# Patient Record
Sex: MALE | Race: BLACK OR AFRICAN AMERICAN | NOT HISPANIC OR LATINO | Employment: UNEMPLOYED | URBAN - METROPOLITAN AREA
[De-identification: names, ages, dates, MRNs, and addresses within clinical notes are randomized per-mention and may not be internally consistent; named-entity substitution may affect disease eponyms.]

---

## 2021-12-08 ENCOUNTER — HOSPITAL ENCOUNTER (EMERGENCY)
Facility: CLINIC | Age: 21
Discharge: HOME | End: 2021-12-08
Attending: EMERGENCY MEDICINE | Admitting: EMERGENCY MEDICINE
Payer: COMMERCIAL

## 2021-12-08 VITALS
OXYGEN SATURATION: 98 % | HEART RATE: 57 BPM | DIASTOLIC BLOOD PRESSURE: 64 MMHG | WEIGHT: 127.3 LBS | TEMPERATURE: 98.6 F | SYSTOLIC BLOOD PRESSURE: 110 MMHG | RESPIRATION RATE: 16 BRPM

## 2021-12-08 DIAGNOSIS — K05.10 GINGIVITIS: ICD-10-CM

## 2021-12-08 PROCEDURE — 99283 EMERGENCY DEPT VISIT LOW MDM: CPT | Performed by: EMERGENCY MEDICINE

## 2021-12-08 PROCEDURE — 250N000013 HC RX MED GY IP 250 OP 250 PS 637: Performed by: EMERGENCY MEDICINE

## 2021-12-08 PROCEDURE — 99284 EMERGENCY DEPT VISIT MOD MDM: CPT | Performed by: EMERGENCY MEDICINE

## 2021-12-08 RX ORDER — KETOROLAC TROMETHAMINE 10 MG/1
10 TABLET, FILM COATED ORAL EVERY 6 HOURS PRN
Qty: 20 TABLET | Refills: 0 | Status: SHIPPED | OUTPATIENT
Start: 2021-12-08

## 2021-12-08 RX ORDER — CHLORHEXIDINE GLUCONATE ORAL RINSE 1.2 MG/ML
15 SOLUTION DENTAL 2 TIMES DAILY
Qty: 473 ML | Refills: 0 | Status: SHIPPED | OUTPATIENT
Start: 2021-12-08

## 2021-12-08 RX ORDER — LIDOCAINE HYDROCHLORIDE 20 MG/ML
5 SOLUTION OROPHARYNGEAL 2 TIMES DAILY
Qty: 100 ML | Refills: 0 | Status: SHIPPED | OUTPATIENT
Start: 2021-12-08

## 2021-12-08 RX ORDER — KETOROLAC TROMETHAMINE 10 MG/1
10 TABLET, FILM COATED ORAL ONCE
Status: DISCONTINUED | OUTPATIENT
Start: 2021-12-08 | End: 2021-12-08 | Stop reason: HOSPADM

## 2021-12-08 RX ORDER — ACETAMINOPHEN 325 MG/1
975 TABLET ORAL ONCE
Status: COMPLETED | OUTPATIENT
Start: 2021-12-08 | End: 2021-12-08

## 2021-12-08 RX ORDER — LIDOCAINE HYDROCHLORIDE 20 MG/ML
5 SOLUTION OROPHARYNGEAL ONCE
Status: DISCONTINUED | OUTPATIENT
Start: 2021-12-08 | End: 2021-12-08 | Stop reason: HOSPADM

## 2021-12-08 RX ADMIN — ACETAMINOPHEN 975 MG: 325 TABLET, FILM COATED ORAL at 16:04

## 2021-12-08 NOTE — ED PROVIDER NOTES
[facility] EMERGENCY DEPARTMENT ([facility])    12/08/21     ED 7 4:11 PM   History     Chief Complaint   Patient presents with     Dental Pain     The history is provided by the patient and medical records.     Nikhil Rossi is a 21 year old male with history of sickle cell trait who presents with pain and bleeding for the past few weeks. He states his whole mouth has been fragile for the past few weeks has bleeding from gums daily, spits out blood daily. He reports that his pain is 6/10, though pain can reach 15/10. The pain is diffusely in his entire mouth, arvind seems focal in his gums and in between his teeth. The pain is at its worst in the mornings. He tried 800 mg Tylenol and Orajel which was ineffective at controlling his pain. He has been using Crest toothpaste and is consistent at brushing his teeth. He denies bleeding elsewhere. He denies any bleeding or clotting disorder. He denies chronic medical problems. No skin swelling or abnormalities. He denies any known allergies. He is a nonsmoker. He flosses sometimes. He denies chewing gum or chewing tobacco. He denies any loose teeth. He can't afford a dentist and so presents here for evaluation. He does not have any dental coverage or insurance whatsoever.     Past Medical History  Past Medical History:   Diagnosis Date     ADHD (attention deficit hyperactivity disorder)      No past surgical history on file.  amoxicillin-clavulanate (AUGMENTIN) 875-125 MG tablet  Amphetamine-Dextroamphetamine (ADDERALL PO)  chlorhexidine (PERIDEX) 0.12 % solution  ketorolac (TORADOL) 10 MG tablet  lidocaine, viscous, (XYLOCAINE) 2 % solution      No Known Allergies  Family History  No family history on file.  Social History   Social History     Tobacco Use     Smoking status: None     Smokeless tobacco: None   Substance Use Topics     Alcohol use: None     Comment: pt would not answer questions     Drug use: None     Comment: pt did not answer question       Past medical history, past surgical history, medications, allergies, family history, and social history were reviewed with the patient. No additional pertinent items.       Review of Systems   HENT: Positive for dental problem (bleeding, painful gums).      A complete review of systems was performed with pertinent positives and negatives noted in the HPI, and all other systems negative.    Physical Exam   BP: 110/64  Pulse: 57  Temp: 98.6  F (37  C)  Resp: 16  Weight: 57.7 kg (127 lb 4.8 oz)  SpO2: 98 %     Physical Exam  Gen:A&Ox3, no acute distress  HEENT:PERRL, no facial tenderness or wounds, head atraumatic, oropharynx clear, mucous membranes moist, TMs clear bilaterally  Neck: no neck or submandibular swelling.   CV:RRR without murmurs  PULM:Clear to auscultation bilaterally  Abd:soft, nontender, nondistended. Bowel sounds present and normal  Dental: No loose or chipped teeth. Has extensive gum friability, hyperpigmentation, tenderness concerning for ANUG. No abscesses.     ED Course      Procedures       No results found for any visits on 12/08/21.  Medications   acetaminophen (TYLENOL) tablet 975 mg (975 mg Oral Given 12/8/21 1604)        Assessments & Plan (with Medical Decision Making)   22 yo M presenting with gum pain and bleeding. Has extensive gingivitis with overgrowth and hyperpigmentation concerning for ANUG. No skin/soft tissue or neck deep space infections noted, and no intraoral abscessed present.   Vitals stable.  Started on a course of augmentin and peridex mouth rinse BID.   Toradol and viscous lidocaine PRN for pain.   Referred to Dental.     I have reviewed the nursing notes. I have reviewed the findings, diagnosis, plan and need for follow up with the patient.    Discharge Medication List as of 12/8/2021  5:00 PM      START taking these medications    Details   amoxicillin-clavulanate (AUGMENTIN) 875-125 MG tablet Take 1 tablet by mouth 2 times daily for 10 days, Disp-20 tablet, R-0,  E-Prescribe      chlorhexidine (PERIDEX) 0.12 % solution Swish and spit 15 mLs in mouth 2 times daily, Disp-473 mL, R-0, E-Prescribe      ketorolac (TORADOL) 10 MG tablet Take 1 tablet (10 mg) by mouth every 6 hours as needed for moderate pain, Disp-20 tablet, R-0, E-Prescribe      lidocaine, viscous, (XYLOCAINE) 2 % solution Swish and spit 5 mLs in mouth 2 times daily Before brushing or flossing; Max 8 doses/24 hour period., Disp-100 mL, R-0, E-Prescribe             Final diagnoses:   Gingivitis     IAnnette, am serving as a trained medical scribe to document services personally performed by Kimberly Duarte MD, FACEP based on the provider's statements to me on December 8, 2021.  This document has been checked and approved by the attending provider.    IKimberly MD, FACEP, was physically present and have reviewed and verified the accuracy of this note documented by Annette Holley, medical scribe.      Kimberly Duarte MD, FACEP     [facility] EMERGENCY DEPARTMENT  12/8/2021     Kimberly Duarte MD  12/11/21 0136       Kimberly Duarte MD  12/11/21 0137

## 2021-12-08 NOTE — DISCHARGE INSTRUCTIONS
Please see a dentist as soon as possible.   [facility] has an urgent care clinic.   [email or web address]  [phone]  Morning and afternoon appointments are available Monday through Friday, 9 a.m. - 4 p.m. *Note that although we cannot guarantee same-day treatment, we will do our best to accommodate walk-in patients.    Other options for dental care:  Many of these clinics offer a sliding fee option for patients that qualify, and see patients on a walk-in or same day basis. Please call each clinic directly. As services, hours, fees and policies vary greatly.    [place]:  [facility]     [phone]  [facility] ([facility]) [phone]  [facility]  [phone]  [facility]      [phone]   [facility]    [phone]  [facility]  [phone]  [facility] [phone]  [facility]     [phone]  [facility]   [phone]  [facility] (cash only)   [phone]  [facility]    [phone] (adults)          [phone] (children)    [place]:  [facility]     [phone]; [phone]  [facility]     [phone]  [facility]     [phone]  [facility] (free, limited)    [phone]    Multiple Locations:  [facility]       [phone]